# Patient Record
Sex: FEMALE | Race: WHITE | NOT HISPANIC OR LATINO | Employment: UNEMPLOYED | URBAN - METROPOLITAN AREA
[De-identification: names, ages, dates, MRNs, and addresses within clinical notes are randomized per-mention and may not be internally consistent; named-entity substitution may affect disease eponyms.]

---

## 2022-12-08 ENCOUNTER — TELEPHONE (OUTPATIENT)
Dept: FAMILY MEDICINE CLINIC | Facility: CLINIC | Age: 57
End: 2022-12-08

## 2022-12-08 NOTE — TELEPHONE ENCOUNTER
Caller: Ann Slaughter    Relationship: Self     Best call back number: [phone]    What medication are you requesting: ALBUTEROL INHALER     What are your current symptoms: WHEEZING     How long have you been experiencing symptoms: TWO NIGHTS     Have you had these symptoms before:    [x] Yes  [] No    Have you been treated for these symptoms before:   [x] Yes  [] No    If a prescription is needed, what is your preferred pharmacy and phone number:      [facility] [id number] - [place] [id number] AT [facility] [address] ([facility])  [phone] [facility] [phone]       Additional notes: PATIENT EXPERIENCES THIS WHEN SHE LAYS DOWN AND ESPECIALLY WHEN SHE IS ON HER LEFT SIDE. PATIENT STATED SHE HAS BEEN PRESCRIBED AN ALBUTEROL INHALER IN THE PAST AND SHE STILL HAS THE ONE SHE HAS HAD FOR A FEW YEARS AND IT ONLY HAS ONE USE LEFT ON IT. PLEASE ADVISE.

## 2022-12-09 RX ORDER — ALBUTEROL SULFATE 90 UG/1
2 AEROSOL, METERED RESPIRATORY (INHALATION) EVERY 6 HOURS PRN
Qty: 18 G | Refills: 1 | Status: SHIPPED | OUTPATIENT
Start: 2022-12-09

## 2022-12-09 NOTE — TELEPHONE ENCOUNTER
Caller: Ann Slaughter    Relationship: Self    Best call back number: [phone]    What was the call regarding: PATIENT WAS CALLING TO CHECK ON THE STATUS OF THIS REQUEST, SHE WOULD LIKE A CALLBACK AS SOON AS POSSIBLE, BEFORE THE END OF THE DAY.     SHE IS OUT OF HER INHALER AND NEEDS TO PICK ANOTHER ONE UP TODAY.     Do you require a callback: YES

## 2024-01-18 ENCOUNTER — TELEMEDICINE (OUTPATIENT)
Dept: INTERNAL MEDICINE | Facility: CLINIC | Age: 59
End: 2024-01-18
Payer: COMMERCIAL

## 2024-01-18 DIAGNOSIS — H57.89 IRRITATION OF BOTH EYES: Primary | ICD-10-CM

## 2024-01-18 PROCEDURE — 99213 OFFICE O/P EST LOW 20 MIN: CPT

## 2024-01-18 RX ORDER — CIPROFLOXACIN HYDROCHLORIDE 3.5 MG/ML
2 SOLUTION/ DROPS TOPICAL 3 TIMES DAILY
Qty: 2.5 ML | Refills: 0 | Status: SHIPPED | OUTPATIENT
Start: 2024-01-18 | End: 2024-01-23

## 2024-01-18 NOTE — PATIENT INSTRUCTIONS
Instill 2 drops of antibiotic eye solution to both eyes 3 times a day for 5 days.  Recommend saline drops in between antibiotic application as needed for comfort.  Apply warm or cold compress to eyes as needed.  Avoid rubbing eyes with hands.  Follow-up as needed.

## 2024-01-18 NOTE — PROGRESS NOTES
"Chief Complaint  No chief complaint on file.    Subjective        Ann Slaughter presents to [facility]  History of Present Illness  58-year-old female presenting via video visit with watery eyes and bumps of eyelids.  Patient Dr. Bergman.  Has been experiencing watery eyes with skin irritation for over 2 and half weeks.  Symptoms started after an acute episode of eye redness and skin discoloration with swelling.  Swelling subsided and eyes have been watering and eyelids irritated ever since.  Bumps on the eyelids have come and gone.  Try to eliminate triggers such as make-up and detergents.  Wiping eyes with clean clots and tissues.  Has tried allergy medications over-the-counter with little effect.  Has applied Neosporin to eyelids which helps some.  Denies any eye pain or vision changes.  Denies thick drainage or crust around eyes.      Objective   Vital Signs:  There were no vitals taken for this visit.  Estimated body mass index is 22.63 kg/m² as calculated from the following:    Height as of 7/14/23: 165.1 cm (65\").    Weight as of 7/14/23: 61.7 kg (136 lb).       BMI is within normal parameters. No other follow-up for BMI required.      Physical Exam  Constitutional:       Appearance: Normal appearance.   Eyes:     Neurological:      Mental Status: She is alert.        Result Review :      Common labs          3/3/2023    12:00   Common Labs   Glucose 98    BUN 11    Creatinine 1.03    Sodium 141    Potassium 4.6    Chloride 101    Calcium 9.8    Total Protein 7.1    Albumin 4.7    Total Bilirubin 1.0    Alkaline Phosphatase 57    AST (SGOT) 21    ALT (SGPT) 18    WBC 5.2    Hemoglobin 13.5    Hematocrit 40.8    Platelets 268    Total Cholesterol 207    Triglycerides 52    HDL Cholesterol 71    LDL Cholesterol  127    Hemoglobin A1C 5.6          Current Outpatient Medications on File Prior to Visit   Medication Sig Dispense Refill    albuterol sulfate  (90 Base) MCG/ACT " inhaler Inhale 2 puffs Every 6 (Six) Hours As Needed for Wheezing or Shortness of Air. 18 g 1    escitalopram (LEXAPRO) 10 MG tablet Take 1 tablet by mouth Daily. 90 tablet 4    Evening Primrose Oil 1000 MG capsule Take  by mouth.      Lysine 1000 MG tablet Take  by mouth.      SUMAtriptan (IMITREX) 50 MG tablet Take 1 tablet by mouth Every 2 (Two) Hours As Needed for Migraine. MAX OF 2 DOSES IN 24 HOURS. 27 tablet 4    vitamin B-12 (CYANOCOBALAMIN) 1000 MCG tablet Take 1 tablet by mouth Daily.      vitamin C (ASCORBIC ACID) 250 MG tablet Take 1 tablet by mouth Daily.       No current facility-administered medications on file prior to visit.              Assessment and Plan     There are no diagnoses linked to this encounter.  Patient Instructions   Instill 2 drops of antibiotic eye solution to both eyes 3 times a day for 5 days.  Recommend saline drops in between antibiotic application as needed for comfort.  Apply warm or cold compress to eyes as needed.  Avoid rubbing eyes with hands.  Follow-up as needed.    You have chosen to receive care through a telehealth visit.  Do you consent to use a video/audio connection for your medical care today? Yes    During visit, patient was at her residence and provider at medical practice office.       I spent 19 minutes caring for Ann on this date of service. This time includes time spent by me in the following activities:preparing for the visit, reviewing tests, obtaining and/or reviewing a separately obtained history, performing a medically appropriate examination and/or evaluation , ordering medications, tests, or procedures, and documenting information in the medical record  Follow Up     No follow-ups on file.  Patient was given instructions and counseling regarding her condition or for health maintenance advice. Please see specific information pulled into the AVS if appropriate.

## 2024-05-28 ENCOUNTER — PATIENT MESSAGE (OUTPATIENT)
Dept: INTERNAL MEDICINE | Facility: CLINIC | Age: 59
End: 2024-05-28
Payer: COMMERCIAL

## 2024-05-28 DIAGNOSIS — F41.9 ANXIETY AND DEPRESSION: ICD-10-CM

## 2024-05-28 DIAGNOSIS — F32.A ANXIETY AND DEPRESSION: ICD-10-CM

## 2024-05-28 DIAGNOSIS — G43.109 MIGRAINE WITH AURA AND WITHOUT STATUS MIGRAINOSUS, NOT INTRACTABLE: ICD-10-CM

## 2024-05-28 NOTE — TELEPHONE ENCOUNTER
Rx Refill Note  Requested Prescriptions     Pending Prescriptions Disp Refills    SUMAtriptan (IMITREX) 50 MG tablet 27 tablet 0     Sig: Take 1 tablet by mouth Every 2 (Two) Hours As Needed for Migraine. MAX OF 2 DOSES IN 24 HOURS.    escitalopram (LEXAPRO) 10 MG tablet 90 tablet 0     Sig: Take 1 tablet by mouth Daily.      Last office visit with prescribing clinician: 2/28/2023   Last telemedicine visit with prescribing clinician: 1/18/2024   Next office visit with prescribing clinician: 7/26/2024

## 2024-05-29 RX ORDER — SUMATRIPTAN 50 MG/1
50 TABLET, FILM COATED ORAL
Qty: 27 TABLET | Refills: 0 | Status: SHIPPED | OUTPATIENT
Start: 2024-05-29

## 2024-05-29 RX ORDER — ESCITALOPRAM OXALATE 10 MG/1
10 TABLET ORAL DAILY
Qty: 90 TABLET | Refills: 1 | Status: SHIPPED | OUTPATIENT
Start: 2024-05-29

## 2024-06-13 ENCOUNTER — TELEPHONE (OUTPATIENT)
Dept: INTERNAL MEDICINE | Facility: CLINIC | Age: 59
End: 2024-06-13
Payer: COMMERCIAL

## 2024-06-13 DIAGNOSIS — J45.30 MILD PERSISTENT ASTHMA WITHOUT COMPLICATION: Primary | ICD-10-CM

## 2024-06-13 RX ORDER — ALBUTEROL SULFATE 90 UG/1
2 AEROSOL, METERED RESPIRATORY (INHALATION) EVERY 6 HOURS PRN
Qty: 18 G | Refills: 1 | Status: SHIPPED | OUTPATIENT
Start: 2024-06-13

## 2024-06-13 NOTE — TELEPHONE ENCOUNTER
"    Caller: Ann Pettit \"Caron\"    Relationship: Self    Best call back number: [phone]    What medication are you requesting:     albuterol sulfate  (90 Base) MCG/ACT inhaler       Have you had these symptoms before:    [x] Yes  [] No    Have you been treated for these symptoms before:   [x] Yes  [] No    If a prescription is needed, what is your preferred pharmacy and phone number: [facility] #[id number] [place] [address] AT [facility]  & [facility] [phone] [facility] [phone] [facility]     Additional notes:  PLEASE CALL TO CONFIRM OR DISCUSS.       "

## 2024-06-14 ENCOUNTER — OFFICE VISIT (OUTPATIENT)
Dept: INTERNAL MEDICINE | Facility: CLINIC | Age: 59
End: 2024-06-14
Payer: COMMERCIAL

## 2024-06-14 VITALS
WEIGHT: 134 LBS | DIASTOLIC BLOOD PRESSURE: 68 MMHG | HEIGHT: 65 IN | SYSTOLIC BLOOD PRESSURE: 101 MMHG | TEMPERATURE: 98.7 F | HEART RATE: 85 BPM | BODY MASS INDEX: 22.33 KG/M2 | OXYGEN SATURATION: 96 %

## 2024-06-14 DIAGNOSIS — H66.002 NON-RECURRENT ACUTE SUPPURATIVE OTITIS MEDIA OF LEFT EAR WITHOUT SPONTANEOUS RUPTURE OF TYMPANIC MEMBRANE: ICD-10-CM

## 2024-06-14 DIAGNOSIS — R05.1 ACUTE COUGH: ICD-10-CM

## 2024-06-14 DIAGNOSIS — J06.9 UPPER RESPIRATORY TRACT INFECTION, UNSPECIFIED TYPE: Primary | ICD-10-CM

## 2024-06-14 PROCEDURE — 99213 OFFICE O/P EST LOW 20 MIN: CPT

## 2024-06-14 RX ORDER — DEXTROMETHORPHAN HYDROBROMIDE AND PROMETHAZINE HYDROCHLORIDE 15; 6.25 MG/5ML; MG/5ML
5 SYRUP ORAL 4 TIMES DAILY PRN
Qty: 118 ML | Refills: 0 | Status: SHIPPED | OUTPATIENT
Start: 2024-06-14

## 2024-06-14 RX ORDER — DOXYCYCLINE HYCLATE 100 MG/1
100 CAPSULE ORAL 2 TIMES DAILY
Qty: 10 CAPSULE | Refills: 0 | Status: SHIPPED | OUTPATIENT
Start: 2024-06-14 | End: 2024-06-19

## 2024-06-14 NOTE — PROGRESS NOTES
Chief Complaint  Cough, URI, Fever, and Ear Fullness     Subjective:      History of Present Illness {CC  Problem List  Visit  Diagnosis   Encounters  Notes  Medications  Labs  Result Review Imaging  Media :23}     Ann Slaughter presents to [facility] for:      History of Present Illness  Pt states this started two weeks ago. Pt went to [facility] and was offered steroid. Pt states that she passed on this as she has never had a steroid before. Pt states she has gotten worse. Pt states her cough is persistent and productive. PT took her sisters tessalon perles, zicam and flonase. Pt has used her inhaler regularly. Pt has had 100.7 and 100.5 fever over the last two days.   Cough  This is a new problem. The current episode started 1 to 4 weeks ago. The problem has been worse. The problem occurs intermittent. The cough is Productive of sputum. Associated symptoms include chills, ear congestion, a fever, headaches, nasal congestion, a sore throat, shortness of breath and wheezing. Pertinent negatives include no ear pain or myalgias. She has tried ipratropium inhaler and OTC cough suppressant for the symptoms. The treatment provided mild relief.   URI   This is a new problem. The current episode started 1 to 4 weeks ago. The problem has been gradually worsening. The maximum temperature recorded prior to her arrival was 100.4 - 100.9 F. The fever has been present for 1 to 2 days. Associated symptoms include congestion, coughing, headaches, a plugged ear sensation, sinus pain, a sore throat and wheezing. Pertinent negatives include no ear pain.   Ear Fullness   There is pain in the left ear. This is a new problem. The current episode started yesterday. The problem has been comes and goes. The maximum temperature recorded prior to her arrival was 100 - 101 F. The fever has been present for 1 to 2 days. Associated symptoms include coughing, headaches and a sore throat.  Pertinent negatives include no drainage.            I have reviewed patient's medical history, any new submitted information provided by patient or medical assistant and updated medical record.      Objective:      Physical Exam  Vitals reviewed.   Constitutional:       General: She is not in acute distress.     Appearance: Normal appearance. She is ill-appearing. She is not toxic-appearing or diaphoretic.   HENT:      Head: Normocephalic.      Right Ear: Ear canal and external ear normal. There is no impacted cerumen. Tympanic membrane is bulging.      Left Ear: Ear canal and external ear normal. There is no impacted cerumen. Tympanic membrane is erythematous and bulging.      Nose: Congestion present.      Right Sinus: Maxillary sinus tenderness and frontal sinus tenderness present.      Left Sinus: Maxillary sinus tenderness and frontal sinus tenderness present.      Mouth/Throat:      Mouth: Mucous membranes are moist.      Pharynx: Posterior oropharyngeal erythema present. No oropharyngeal exudate.   Eyes:      General:         Right eye: No discharge.         Left eye: No discharge.      Conjunctiva/sclera: Conjunctivae normal.      Pupils: Pupils are equal, round, and reactive to light.   Cardiovascular:      Rate and Rhythm: Normal rate and regular rhythm.      Pulses: Normal pulses.      Heart sounds: Normal heart sounds.   Pulmonary:      Effort: Pulmonary effort is normal.      Breath sounds: Examination of the right-lower field reveals decreased breath sounds. Examination of the left-lower field reveals decreased breath sounds. Decreased breath sounds present.   Lymphadenopathy:      Cervical: Cervical adenopathy present.   Skin:     General: Skin is warm and dry.      Capillary Refill: Capillary refill takes less than 2 seconds.   Neurological:      General: No focal deficit present.      Mental Status: She is alert.   Psychiatric:         Mood and Affect: Mood normal.         Behavior: Behavior normal.  "       Result Review  Data Reviewed:{ Labs  Result Review  Imaging  Med Tab  Media :23}                Vital Signs:   /68 (BP Location: Left arm, Patient Position: Sitting, Cuff Size: Adult)   Pulse 85   Temp 98.7 °F (37.1 °C) (Oral)   Ht 165.1 cm (65\")   Wt 60.8 kg (134 lb)   SpO2 96%   BMI 22.30 kg/m²   Estimated body mass index is 22.3 kg/m² as calculated from the following:    Height as of this encounter: 165.1 cm (65\").    Weight as of this encounter: 60.8 kg (134 lb).        Requested Prescriptions     Signed Prescriptions Disp Refills    doxycycline (VIBRAMYCIN) 100 MG capsule 10 capsule 0     Sig: Take 1 capsule by mouth 2 (Two) Times a Day for 5 days.    promethazine-dextromethorphan (PROMETHAZINE-DM) 6.25-15 MG/5ML syrup 118 mL 0     Sig: Take 5 mL by mouth 4 (Four) Times a Day As Needed for Cough.       Routine medications provided by this office will also be refilled via pharmacy request.       Current Outpatient Medications:     albuterol sulfate  (90 Base) MCG/ACT inhaler, Inhale 2 puffs Every 6 (Six) Hours As Needed for Wheezing or Shortness of Air., Disp: 18 g, Rfl: 1    escitalopram (LEXAPRO) 10 MG tablet, Take 1 tablet by mouth Daily., Disp: 90 tablet, Rfl: 1    Evening Primrose Oil 1000 MG capsule, Take  by mouth., Disp: , Rfl:     Lysine 1000 MG tablet, Take  by mouth., Disp: , Rfl:     SUMAtriptan (IMITREX) 50 MG tablet, Take 1 tablet by mouth Every 2 (Two) Hours As Needed for Migraine. MAX OF 2 DOSES IN 24 HOURS., Disp: 27 tablet, Rfl: 0    vitamin B-12 (CYANOCOBALAMIN) 1000 MCG tablet, Take 1 tablet by mouth Daily., Disp: , Rfl:     vitamin C (ASCORBIC ACID) 250 MG tablet, Take 1 tablet by mouth Daily., Disp: , Rfl:     doxycycline (VIBRAMYCIN) 100 MG capsule, Take 1 capsule by mouth 2 (Two) Times a Day for 5 days., Disp: 10 capsule, Rfl: 0    promethazine-dextromethorphan (PROMETHAZINE-DM) 6.25-15 MG/5ML syrup, Take 5 mL by mouth 4 (Four) Times a Day As Needed for " Cough., Disp: 118 mL, Rfl: 0     Assessment and Plan:      Assessment and Plan {CC Problem List  Visit Diagnosis  ROS  Review (Popup)  Health Maintenance  Quality  BestPractice  Medications  SmartSets  SnapShot Encounters  Media :23}     Diagnoses and all orders for this visit:    1. Upper respiratory tract infection, unspecified type (Primary)  -     doxycycline (VIBRAMYCIN) 100 MG capsule; Take 1 capsule by mouth 2 (Two) Times a Day for 5 days.  Dispense: 10 capsule; Refill: 0    2. Acute cough  -     promethazine-dextromethorphan (PROMETHAZINE-DM) 6.25-15 MG/5ML syrup; Take 5 mL by mouth 4 (Four) Times a Day As Needed for Cough.  Dispense: 118 mL; Refill: 0    3. Non-recurrent acute suppurative otitis media of left ear without spontaneous rupture of tympanic membrane  -     doxycycline (VIBRAMYCIN) 100 MG capsule; Take 1 capsule by mouth 2 (Two) Times a Day for 5 days.  Dispense: 10 capsule; Refill: 0             New Medications Ordered This Visit   Medications    doxycycline (VIBRAMYCIN) 100 MG capsule     Sig: Take 1 capsule by mouth 2 (Two) Times a Day for 5 days.     Dispense:  10 capsule     Refill:  0    promethazine-dextromethorphan (PROMETHAZINE-DM) 6.25-15 MG/5ML syrup     Sig: Take 5 mL by mouth 4 (Four) Times a Day As Needed for Cough.     Dispense:  118 mL     Refill:  0       Educated patient on dosing and side effects of doxycycline and Promethazine DM.  Educated patient that if this worsens or fever is unable to be treated to be seen in the emergency room.  If this antibiotic does not help we will consider adding additional antibiotic or steroid.  Patient verbalized understanding and is comfortable with plan of care.    Follow Up {Instructions Charge Capture  Follow-up Communications :23}     No follow-ups on file.      Patient was given instructions and counseling regarding her condition or for health maintenance advice. Please see specific information pulled into the AVS if  appropriate.    Dragon disclaimer:   Much of this encounter note is an electronic transcription/translation of spoken language to printed text. The electronic translation of spoken language may permit erroneous, or at times, nonsensical words or phrases to be inadvertently transcribed; Although I have reviewed the note for such errors, some may still exist.     Additional Patient Counseling:       There are no Patient Instructions on file for this visit.

## 2024-07-24 ENCOUNTER — OFFICE VISIT (OUTPATIENT)
Dept: INTERNAL MEDICINE | Facility: CLINIC | Age: 59
End: 2024-07-24
Payer: COMMERCIAL

## 2024-07-24 VITALS
SYSTOLIC BLOOD PRESSURE: 115 MMHG | HEIGHT: 65 IN | DIASTOLIC BLOOD PRESSURE: 60 MMHG | HEART RATE: 78 BPM | WEIGHT: 128 LBS | RESPIRATION RATE: 18 BRPM | BODY MASS INDEX: 21.33 KG/M2 | OXYGEN SATURATION: 96 %

## 2024-07-24 DIAGNOSIS — Z13.1 SCREENING FOR DIABETES MELLITUS: ICD-10-CM

## 2024-07-24 DIAGNOSIS — Z00.00 ENCOUNTER FOR HEALTH MAINTENANCE EXAMINATION: Primary | ICD-10-CM

## 2024-07-24 DIAGNOSIS — Z13.0 SCREENING FOR DEFICIENCY ANEMIA: ICD-10-CM

## 2024-07-24 DIAGNOSIS — Z13.220 SCREENING FOR LIPID DISORDERS: ICD-10-CM

## 2024-07-24 DIAGNOSIS — Z13.29 SCREENING FOR THYROID DISORDER: ICD-10-CM

## 2024-07-24 LAB
ALBUMIN SERPL-MCNC: 4.8 G/DL (ref 3.5–5.2)
ALBUMIN/GLOB SERPL: 1.9 G/DL
ALP SERPL-CCNC: 63 U/L (ref 39–117)
ALT SERPL-CCNC: 14 U/L (ref 1–33)
AST SERPL-CCNC: 23 U/L (ref 1–32)
BILIRUB SERPL-MCNC: 1 MG/DL (ref 0–1.2)
BUN SERPL-MCNC: 8 MG/DL (ref 8–23)
BUN/CREAT SERPL: 7.7 (ref 7–25)
CALCIUM SERPL-MCNC: 9.6 MG/DL (ref 8.6–10.5)
CHLORIDE SERPL-SCNC: 100 MMOL/L (ref 98–107)
CHOLEST SERPL-MCNC: 227 MG/DL (ref 0–200)
CO2 SERPL-SCNC: 28.9 MMOL/L (ref 22–29)
CREAT SERPL-MCNC: 1.04 MG/DL (ref 0.57–1)
EGFRCR SERPLBLD CKD-EPI 2021: 61.7 ML/MIN/1.73
ERYTHROCYTE [DISTWIDTH] IN BLOOD BY AUTOMATED COUNT: 13 % (ref 12.3–15.4)
GLOBULIN SER CALC-MCNC: 2.5 GM/DL
GLUCOSE SERPL-MCNC: 95 MG/DL (ref 65–99)
HBA1C MFR BLD: 5.5 % (ref 4.8–5.6)
HCT VFR BLD AUTO: 41 % (ref 34–46.6)
HDLC SERPL-MCNC: 80 MG/DL (ref 40–60)
HGB BLD-MCNC: 13.5 G/DL (ref 12–15.9)
LDLC SERPL CALC-MCNC: 139 MG/DL (ref 0–100)
LDLC/HDLC SERPL: 1.73 {RATIO}
MCH RBC QN AUTO: 29.9 PG (ref 26.6–33)
MCHC RBC AUTO-ENTMCNC: 32.9 G/DL (ref 31.5–35.7)
MCV RBC AUTO: 90.7 FL (ref 79–97)
PLATELET # BLD AUTO: 298 10*3/MM3 (ref 140–450)
POTASSIUM SERPL-SCNC: 4.7 MMOL/L (ref 3.5–5.2)
PROT SERPL-MCNC: 7.3 G/DL (ref 6–8.5)
RBC # BLD AUTO: 4.52 10*6/MM3 (ref 3.77–5.28)
SODIUM SERPL-SCNC: 138 MMOL/L (ref 136–145)
TRIGL SERPL-MCNC: 45 MG/DL (ref 0–150)
TSH SERPL DL<=0.005 MIU/L-ACNC: 1.87 UIU/ML (ref 0.27–4.2)
VLDLC SERPL CALC-MCNC: 8 MG/DL (ref 5–40)
WBC # BLD AUTO: 4.22 10*3/MM3 (ref 3.4–10.8)

## 2024-07-24 PROCEDURE — 99396 PREV VISIT EST AGE 40-64: CPT | Performed by: FAMILY MEDICINE

## 2024-07-24 NOTE — PATIENT INSTRUCTIONS
"MyChart Tips:    [employer] can be a useful part of our patient care program and is a way for us to communicate lab results and for you to request refills.  Here is some information regarding the best way to use Ev[employer] for communication.       Examples of medical issues that are APPROPRIATE for [employer]:  -Follow-up on problems we have already addressed in a visit such as home testing results, blood pressure readings, glucose readings  -Questions that can be answered with a simple \"yes\" or \"no\"  -Please keep communication concise and to the point.  All other types of communication should be held for an office visit.    Communication that is NOT APPROPRIATE for [employer]:  -New problems, serious problems or urgent problems.  Urgent matters should be addressed by phone for advice, in the office, urgent care or the ER.  -Requesting Paxlovid or Antibiotics: These types of problems require an evaluation unless your provider approved this previously.    -Evi messages are not email.  Staff will check messages each weekday.  We strive for a 48-hour turnaround on messaging.    -Evi is not for private issues.  Messages are received first by our office staff.    Please be mindful that sometimes it may take longer to receive a response on Evi.  Many times of the year we have high volumes of messages coming into physician inboxes.      Please also be mindful that Evi messages become part of your permanent medical record.    We appreciate your respect for the limitations and boundaries of Evi.      Lab Results:  Please allow up to 7 business days for lab results to be sent through Evi to you before contacting your provider.  Sometimes we are waiting for results to get back from the lab and also your provider needs time to analyze them thoroughly before making recommendations.  Also, providers may be out of the office on vacation.      While the internet has great resources, it is not a substitute for " interpreting lab results.

## 2024-07-24 NOTE — PROGRESS NOTES
"Chief Complaint   Patient presents with    Annual Exam       Subjective       CPE- Patient is here today for annual physical.  Overall doing well.  No concerns at this time.  Taking all medication as prescribed below.      Current Outpatient Medications:     albuterol sulfate  (90 Base) MCG/ACT inhaler, Inhale 2 puffs Every 6 (Six) Hours As Needed for Wheezing or Shortness of Air., Disp: 18 g, Rfl: 1    escitalopram (LEXAPRO) 10 MG tablet, Take 1 tablet by mouth Daily., Disp: 90 tablet, Rfl: 1    Evening Primrose Oil 1000 MG capsule, Take  by mouth., Disp: , Rfl:     Lysine 1000 MG tablet, Take  by mouth., Disp: , Rfl:     SUMAtriptan (IMITREX) 50 MG tablet, Take 1 tablet by mouth Every 2 (Two) Hours As Needed for Migraine. MAX OF 2 DOSES IN 24 HOURS., Disp: 27 tablet, Rfl: 0    vitamin B-12 (CYANOCOBALAMIN) 1000 MCG tablet, Take 1 tablet by mouth Daily., Disp: , Rfl:     vitamin C (ASCORBIC ACID) 250 MG tablet, Take 1 tablet by mouth Daily., Disp: , Rfl:       Labs: Due for routine labs    Colorectal cancer screening: [facility]  Breast cancer screening: [facility]  Cervical cancer screening: [facility]      Vitals:    07/24/24 1042   BP: 115/60   BP Location: Right arm   Patient Position: Sitting   Cuff Size: Small Adult   Pulse: 78   Resp: 18   SpO2: 96%   Weight: 58.1 kg (128 lb)   Height: 165.1 cm (65\")     Body mass index is 21.3 kg/m².  BMI is within normal parameters. No other follow-up for BMI required.        Physical Exam  Vitals and nursing note reviewed.   Constitutional:       General: She is not in acute distress.     Appearance: Normal appearance.   HENT:      Right Ear: Tympanic membrane, ear canal and external ear normal.      Left Ear: Tympanic membrane, ear canal and external ear normal.      Nose: Nose normal.      Mouth/Throat:      Mouth: Mucous membranes are moist.   Eyes:      General:         Right eye: No discharge.         Left eye: No discharge.      Conjunctiva/sclera: Conjunctivae normal. "   Cardiovascular:      Rate and Rhythm: Normal rate and regular rhythm.      Pulses: Normal pulses.      Heart sounds: Normal heart sounds.   Pulmonary:      Effort: Pulmonary effort is normal.      Breath sounds: Normal breath sounds.   Abdominal:      General: Bowel sounds are normal. There is no distension.      Palpations: Abdomen is soft.      Tenderness: There is no abdominal tenderness.   Musculoskeletal:      Cervical back: Neck supple.      Right lower leg: No edema.      Left lower leg: No edema.   Lymphadenopathy:      Cervical: No cervical adenopathy.   Skin:     General: Skin is warm.      Findings: No rash.   Neurological:      General: No focal deficit present.      Mental Status: She is alert. Mental status is at baseline.   Psychiatric:         Mood and Affect: Mood normal.         Behavior: Behavior normal.                   Diagnoses and all orders for this visit:    1. Encounter for health maintenance examination (Primary)  -     CBC (No Diff)  -     Comprehensive Metabolic Panel  -     Lipid Panel With LDL / HDL Ratio  -     Hemoglobin A1c  -     TSH Rfx On Abnormal To Free T4    2. Screening for deficiency anemia  -     CBC (No Diff)    3. Screening for diabetes mellitus  -     Comprehensive Metabolic Panel  -     Hemoglobin A1c    4. Screening for thyroid disorder  -     TSH Rfx On Abnormal To Free T4    5. Screening for lipid disorders  -     Lipid Panel With LDL / HDL Ratio           The patient was counseled regarding nutrition, physical activity, healthy weight, injury prevention, misuse of tobacco, alcohol and illicit drugs, mental health, immunizations, and screenings.    Return if symptoms worsen or fail to improve.

## 2024-07-26 DIAGNOSIS — G43.109 MIGRAINE WITH AURA AND WITHOUT STATUS MIGRAINOSUS, NOT INTRACTABLE: ICD-10-CM

## 2024-07-26 DIAGNOSIS — F41.9 ANXIETY AND DEPRESSION: ICD-10-CM

## 2024-07-26 DIAGNOSIS — F32.A ANXIETY AND DEPRESSION: ICD-10-CM

## 2024-07-26 RX ORDER — ESCITALOPRAM OXALATE 10 MG/1
10 TABLET ORAL DAILY
Qty: 90 TABLET | Refills: 0 | Status: SHIPPED | OUTPATIENT
Start: 2024-07-26

## 2024-07-26 RX ORDER — SUMATRIPTAN 50 MG/1
50 TABLET, FILM COATED ORAL
Qty: 27 TABLET | Refills: 0 | Status: SHIPPED | OUTPATIENT
Start: 2024-07-26

## 2024-07-26 NOTE — TELEPHONE ENCOUNTER
I am not sending in a year for anyone.  If I did, it was an error on her .  I am only sending in 90 days.  The practice will refill any medications after I am gone to urgent care.

## 2024-10-24 ENCOUNTER — PATIENT MESSAGE (OUTPATIENT)
Dept: INTERNAL MEDICINE | Facility: CLINIC | Age: 59
End: 2024-10-24
Payer: COMMERCIAL

## 2024-10-24 DIAGNOSIS — G43.109 MIGRAINE WITH AURA AND WITHOUT STATUS MIGRAINOSUS, NOT INTRACTABLE: ICD-10-CM

## 2024-10-24 DIAGNOSIS — F41.9 ANXIETY AND DEPRESSION: ICD-10-CM

## 2024-10-24 DIAGNOSIS — F32.A ANXIETY AND DEPRESSION: ICD-10-CM

## 2024-11-01 RX ORDER — ESCITALOPRAM OXALATE 10 MG/1
10 TABLET ORAL DAILY
Qty: 90 TABLET | Refills: 0 | Status: SHIPPED | OUTPATIENT
Start: 2024-11-01

## 2024-11-01 RX ORDER — SUMATRIPTAN 50 MG/1
50 TABLET, FILM COATED ORAL
Qty: 27 TABLET | Refills: 0 | Status: SHIPPED | OUTPATIENT
Start: 2024-11-01

## 2024-11-01 NOTE — TELEPHONE ENCOUNTER
Rx Refill Note  Requested Prescriptions     Pending Prescriptions Disp Refills    escitalopram (LEXAPRO) 10 MG tablet 90 tablet 0     Sig: Take 1 tablet by mouth Daily.    SUMAtriptan (IMITREX) 50 MG tablet 27 tablet 0     Sig: Take 1 tablet by mouth Every 2 (Two) Hours As Needed for Migraine. MAX OF 2 DOSES IN 24 HOURS.      Last office visit with prescribing clinician: Visit date not found   Last telemedicine visit with prescribing clinician: Visit date not found   Next office visit with prescribing clinician: Visit date not found                         Would you like a call back once the refill request has been completed: [] Yes [] No    If the office needs to give you a call back, can they leave a voicemail: [] Yes [] No    Snow Lindsay  11/01/24, 09:09 EDT

## 2025-01-13 ENCOUNTER — TELEPHONE (OUTPATIENT)
Dept: INTERNAL MEDICINE | Facility: CLINIC | Age: 60
End: 2025-01-13
Payer: COMMERCIAL

## 2025-01-13 DIAGNOSIS — G43.109 MIGRAINE WITH AURA AND WITHOUT STATUS MIGRAINOSUS, NOT INTRACTABLE: ICD-10-CM

## 2025-01-13 NOTE — TELEPHONE ENCOUNTER
"Caller: Ann Pettit \"Caron\"    Relationship to patient: Self    Patient is needing: PATIENT WANTS TO MAKE SURE THAT HER REQUEST FOR HER MIGRAINE MEDICATION GETS REFILLED, AND SHE STATES SHE HAS AN APPT NEXT WEEK WITH GENARO HER.           "

## 2025-01-14 RX ORDER — SUMATRIPTAN 50 MG/1
50 TABLET, FILM COATED ORAL
Qty: 27 TABLET | Refills: 0 | Status: SHIPPED | OUTPATIENT
Start: 2025-01-14

## 2025-01-27 ENCOUNTER — OFFICE VISIT (OUTPATIENT)
Dept: INTERNAL MEDICINE | Facility: CLINIC | Age: 60
End: 2025-01-27
Payer: COMMERCIAL

## 2025-01-27 VITALS
TEMPERATURE: 97.8 F | BODY MASS INDEX: 23.93 KG/M2 | DIASTOLIC BLOOD PRESSURE: 68 MMHG | HEART RATE: 69 BPM | SYSTOLIC BLOOD PRESSURE: 106 MMHG | WEIGHT: 140.2 LBS | OXYGEN SATURATION: 95 % | HEIGHT: 64 IN

## 2025-01-27 DIAGNOSIS — H93.12 TINNITUS OF LEFT EAR: Chronic | ICD-10-CM

## 2025-01-27 DIAGNOSIS — Z76.89 ENCOUNTER TO ESTABLISH CARE: Primary | ICD-10-CM

## 2025-01-27 DIAGNOSIS — F32.A ANXIETY AND DEPRESSION: Chronic | ICD-10-CM

## 2025-01-27 DIAGNOSIS — G43.109 MIGRAINE WITH AURA AND WITHOUT STATUS MIGRAINOSUS, NOT INTRACTABLE: Chronic | ICD-10-CM

## 2025-01-27 DIAGNOSIS — F41.9 ANXIETY AND DEPRESSION: Chronic | ICD-10-CM

## 2025-01-27 PROCEDURE — 99214 OFFICE O/P EST MOD 30 MIN: CPT

## 2025-01-27 RX ORDER — ESCITALOPRAM OXALATE 10 MG/1
10 TABLET ORAL DAILY
Qty: 90 TABLET | Refills: 1 | Status: SHIPPED | OUTPATIENT
Start: 2025-01-27

## 2025-01-27 RX ORDER — SUMATRIPTAN 50 MG/1
50 TABLET, FILM COATED ORAL
Qty: 27 TABLET | Refills: 0 | Status: SHIPPED | OUTPATIENT
Start: 2025-01-27

## 2025-01-27 NOTE — PROGRESS NOTES
Chief Complaint  Establish Care and Med Refill     Subjective:      History of Present Illness {CC  Problem List  Visit  Diagnosis   Encounters  Notes  Medications  Labs  Result Review Imaging  Media :23}     Ann Slaughter presents to [facility] for:      History of Present Illness        The patient presents to establish care with a new provider.    She has been on Lexapro 10 mg for an extended period, which she reports as beneficial. She is seeking a refill of this medication.    She experiences migraines approximately 4 to 5 times per month. She manages these with sumatriptan, magnesium, or Advil, depending on the severity of the headache. She is also requesting a refill of her sumatriptan prescription. She is interested in exploring other treatment options for her migraines.    She has been experiencing tinnitus for an extended period, with varying degrees of severity. She does not recall any childhood ear infections but acknowledges a recent ear infection. However, she does not believe the tinnitus worsened during this time. She does not have an ENT specialist and is seeking a referral.    She has been dealing with sinus issues over the past week, which have caused significant discomfort. She has used Flonase to alleviate her symptoms, which resulted in the expectoration of dark green mucus. She is concerned about potential side effects of daily Flonase use.    MEDICATIONS  Lexapro, sumatriptan, magnesium, Advil         I have reviewed patient's medical history, any new submitted information provided by patient or medical assistant and updated medical record.      Objective:      Physical Exam  Vitals reviewed.   Constitutional:       Appearance: Normal appearance.   HENT:      Head: Normocephalic.      Right Ear: Ear canal and external ear normal. A middle ear effusion is present. There is no impacted cerumen.      Left Ear: Ear canal and external ear  "normal. A middle ear effusion is present. There is no impacted cerumen.   Cardiovascular:      Rate and Rhythm: Normal rate and regular rhythm.      Pulses: Normal pulses.      Heart sounds: Normal heart sounds.   Pulmonary:      Effort: Pulmonary effort is normal.      Breath sounds: Normal breath sounds.   Abdominal:      General: Abdomen is flat. Bowel sounds are normal.      Palpations: Abdomen is soft.   Skin:     General: Skin is warm and dry.      Capillary Refill: Capillary refill takes less than 2 seconds.   Neurological:      General: No focal deficit present.      Mental Status: She is alert.   Psychiatric:         Mood and Affect: Mood normal.         Behavior: Behavior normal.        Result Review  Data Reviewed:{ Labs  Result Review  Imaging  Med Tab  Media :23}     Results  Laboratory Studies  Cholesterol was a little bit high, but HDL was also high.                  Vital Signs:   /68 (BP Location: Left arm, Patient Position: Sitting, Cuff Size: Adult)   Pulse 69   Temp 97.8 °F (36.6 °C) (Oral)   Ht 162.6 cm (64\")   Wt 63.6 kg (140 lb 3.2 oz)   SpO2 95%   BMI 24.07 kg/m²                 Requested Prescriptions     Pending Prescriptions Disp Refills    SUMAtriptan (IMITREX) 50 MG tablet 27 tablet 0     Sig: Take 1 tablet by mouth Every 2 (Two) Hours As Needed for Migraine. MAX OF 2 DOSES IN 24 HOURS.    escitalopram (LEXAPRO) 10 MG tablet 90 tablet 0     Sig: Take 1 tablet by mouth Daily.       Routine medications provided by this office will also be refilled via pharmacy request.       Current Outpatient Medications:     albuterol sulfate  (90 Base) MCG/ACT inhaler, Inhale 2 puffs Every 6 (Six) Hours As Needed for Wheezing or Shortness of Air., Disp: 18 g, Rfl: 1    escitalopram (LEXAPRO) 10 MG tablet, Take 1 tablet by mouth Daily., Disp: 90 tablet, Rfl: 0    Lysine 1000 MG tablet, Take  by mouth., Disp: , Rfl:     SUMAtriptan (IMITREX) 50 MG tablet, Take 1 tablet by mouth " Every 2 (Two) Hours As Needed for Migraine. MAX OF 2 DOSES IN 24 HOURS., Disp: 27 tablet, Rfl: 0    vitamin B-12 (CYANOCOBALAMIN) 1000 MCG tablet, Take 1 tablet by mouth Daily., Disp: , Rfl:     vitamin C (ASCORBIC ACID) 250 MG tablet, Take 1 tablet by mouth Daily., Disp: , Rfl:     Evening Primrose Oil 1000 MG capsule, Take  by mouth., Disp: , Rfl:      Assessment and Plan:      Assessment and Plan {CC Problem List  Visit Diagnosis  ROS  Review (Popup)  [facility] Maintenance  Quality  BestPractice  Medications  SmartSets  SnapShot Encounters  Media :23}     Problem List Items Addressed This Visit       Anxiety and depression (Chronic)    Migraines (Chronic)          1. Migraine.  She uses sumatriptan approximately 4 to 5 times a month for severe headaches. If the frequency of sumatriptan use increases, alternative medications such as propranolol or Qulipta may be considered, although propranolol requires monitoring due to her low blood pressure, and Qulipta may need prior authorization. Other abortive medications like Nurtec and Ubrelvy were discussed as potential options if Imitrex becomes less effective. A prescription for a 3-month supply of sumatriptan will be sent to [facility].    2. Tinnitus.  She reports long-standing tinnitus, which sometimes worsens but is often unnoticeable. Examination revealed fluid behind the tympanic membrane, possibly due to allergies. Daily use of over-the-counter antihistamines such as Zyrtec, Xyzal, or Claritin is recommended. Nasal antihistamines like Astepro or Flonase can also be used daily. A referral to an ENT specialist for a comprehensive ear examination and hearing test is provided.    3. Cholesterol management.  Her last lab work in July 2024 indicated slightly elevated cholesterol levels, but her HDL was also high, which is beneficial. No medication is prescribed at this time. She is advised to continue monitoring her cholesterol levels and consider  dietary adjustments.    4. Medication management.  She is currently on Lexapro 10 mg and reports feeling good on this medication. A prescription for a 3-month supply of Lexapro will be sent to [facility].    Follow-up  The patient will follow up in 6 months for her annual physical.     Patient verbalizes understanding and is comfortable with the plan of care.      Follow Up {Instructions Charge Capture  Follow-up Communications :23}     No follow-ups on file.      Patient was given instructions and counseling regarding her condition or for health maintenance advice. Please see specific information pulled into the AVS if appropriate.    Dragon disclaimer:   Much of this encounter note is an electronic transcription/translation of spoken language to printed text. The electronic translation of spoken language may permit erroneous, or at times, nonsensical words or phrases to be inadvertently transcribed; Although I have reviewed the note for such errors, some may still exist.         Additional Patient Counseling:       There are no Patient Instructions on file for this visit.    Patient or patient representative verbalized consent for the use of Ambient Listening during the visit with  BERLIN Burger for chart documentation. 1/27/2025  16:18 EST

## 2025-03-15 DIAGNOSIS — G43.109 MIGRAINE WITH AURA AND WITHOUT STATUS MIGRAINOSUS, NOT INTRACTABLE: Chronic | ICD-10-CM

## 2025-03-17 RX ORDER — SUMATRIPTAN 50 MG/1
TABLET, FILM COATED ORAL
Qty: 27 TABLET | Refills: 8 | OUTPATIENT
Start: 2025-03-17

## 2025-04-19 DIAGNOSIS — G43.109 MIGRAINE WITH AURA AND WITHOUT STATUS MIGRAINOSUS, NOT INTRACTABLE: Chronic | ICD-10-CM

## 2025-04-24 NOTE — TELEPHONE ENCOUNTER
Rx Refill Note  Requested Prescriptions     Pending Prescriptions Disp Refills    Lysine 1000 MG tablet       Sig: Take  by mouth.    SUMAtriptan (IMITREX) 50 MG tablet 27 tablet 0     Sig: Take 1 tablet by mouth Every 2 (Two) Hours As Needed for Migraine. MAX OF 2 DOSES IN 24 HOURS.      Last office visit with prescribing clinician: 1/27/2025   Last telemedicine visit with prescribing clinician: Visit date not found   Next office visit with prescribing clinician: 8/11/2025                         Would you like a call back once the refill request has been completed: [] Yes [] No    If the office needs to give you a call back, can they leave a voicemail: [] Yes [] No    Marquez Yang MA  04/24/25, 08:46 EDT

## 2025-04-25 NOTE — TELEPHONE ENCOUNTER
"  Caller: Ann Pettit \"Caron\"    Relationship: Self    Best call back number: [phone]      What was the call regarding: PATIENT IS CALLING TO FOLLOW UP ON THE MEDICATION REFILL          [facility].          SUMAtriptan (IMITREX) 50 MG tablet       Lysine 1000 MG tablet     PLEASE GIVE CALLBACK       "

## 2025-04-27 RX ORDER — SUMATRIPTAN 50 MG/1
50 TABLET, FILM COATED ORAL
Qty: 27 TABLET | Refills: 0 | Status: SHIPPED | OUTPATIENT
Start: 2025-04-27

## 2025-07-03 DIAGNOSIS — F41.9 ANXIETY AND DEPRESSION: Chronic | ICD-10-CM

## 2025-07-03 DIAGNOSIS — F32.A ANXIETY AND DEPRESSION: Chronic | ICD-10-CM

## 2025-07-03 RX ORDER — ESCITALOPRAM OXALATE 10 MG/1
10 TABLET ORAL DAILY
Qty: 90 TABLET | Refills: 0 | Status: SHIPPED | OUTPATIENT
Start: 2025-07-03

## 2025-08-11 ENCOUNTER — OFFICE VISIT (OUTPATIENT)
Dept: INTERNAL MEDICINE | Facility: CLINIC | Age: 60
End: 2025-08-11
Payer: COMMERCIAL

## 2025-08-11 VITALS
SYSTOLIC BLOOD PRESSURE: 104 MMHG | OXYGEN SATURATION: 98 % | DIASTOLIC BLOOD PRESSURE: 68 MMHG | TEMPERATURE: 97.7 F | HEART RATE: 60 BPM | BODY MASS INDEX: 23.6 KG/M2 | WEIGHT: 138.2 LBS | HEIGHT: 64 IN

## 2025-08-11 DIAGNOSIS — Z13.1 SCREENING FOR DIABETES MELLITUS: ICD-10-CM

## 2025-08-11 DIAGNOSIS — J45.40 MODERATE PERSISTENT ASTHMA WITHOUT COMPLICATION: Chronic | ICD-10-CM

## 2025-08-11 DIAGNOSIS — Z13.6 HYPERTENSION SCREEN: ICD-10-CM

## 2025-08-11 DIAGNOSIS — F32.A ANXIETY AND DEPRESSION: Chronic | ICD-10-CM

## 2025-08-11 DIAGNOSIS — G43.E11 INTRACTABLE CHRONIC MIGRAINE WITH AURA WITH STATUS MIGRAINOSUS: Chronic | ICD-10-CM

## 2025-08-11 DIAGNOSIS — G43.109 MIGRAINE WITH AURA AND WITHOUT STATUS MIGRAINOSUS, NOT INTRACTABLE: Chronic | ICD-10-CM

## 2025-08-11 DIAGNOSIS — Z80.0 FAMILY HX OF COLON CANCER: Chronic | ICD-10-CM

## 2025-08-11 DIAGNOSIS — Z12.11 ENCOUNTER FOR SCREENING FOR MALIGNANT NEOPLASM OF COLON: ICD-10-CM

## 2025-08-11 DIAGNOSIS — Z12.11 SCREENING FOR COLON CANCER: ICD-10-CM

## 2025-08-11 DIAGNOSIS — E78.2 MODERATE MIXED HYPERLIPIDEMIA NOT REQUIRING STATIN THERAPY: Chronic | ICD-10-CM

## 2025-08-11 DIAGNOSIS — Z28.20 VACCINE REFUSED BY PATIENT: ICD-10-CM

## 2025-08-11 DIAGNOSIS — Z00.00 ROUTINE GENERAL MEDICAL EXAMINATION AT A HEALTH CARE FACILITY: Primary | ICD-10-CM

## 2025-08-11 DIAGNOSIS — F41.9 ANXIETY AND DEPRESSION: Chronic | ICD-10-CM

## 2025-08-11 DIAGNOSIS — Z13.6 SCREENING FOR CARDIOVASCULAR CONDITION: ICD-10-CM

## 2025-08-11 DIAGNOSIS — Z83.49 FAMILY HISTORY OF HYPOTHYROIDISM: Chronic | ICD-10-CM

## 2025-08-11 DIAGNOSIS — Z91.89 AT RISK FOR CORONARY ARTERY DISEASE: ICD-10-CM

## 2025-08-11 PROCEDURE — 99396 PREV VISIT EST AGE 40-64: CPT

## 2025-08-11 PROCEDURE — 99214 OFFICE O/P EST MOD 30 MIN: CPT

## 2025-08-12 ENCOUNTER — RESULTS FOLLOW-UP (OUTPATIENT)
Dept: INTERNAL MEDICINE | Facility: CLINIC | Age: 60
End: 2025-08-12
Payer: COMMERCIAL

## 2025-08-12 LAB
ALBUMIN SERPL-MCNC: 4.6 G/DL (ref 3.9–4.9)
ALP SERPL-CCNC: 57 IU/L (ref 44–121)
ALT SERPL-CCNC: 15 IU/L (ref 0–32)
AST SERPL-CCNC: 22 IU/L (ref 0–40)
BASOPHILS # BLD AUTO: 0 X10E3/UL (ref 0–0.2)
BASOPHILS NFR BLD AUTO: 1 %
BILIRUB SERPL-MCNC: 0.9 MG/DL (ref 0–1.2)
BUN SERPL-MCNC: 12 MG/DL (ref 8–27)
BUN/CREAT SERPL: 13 (ref 12–28)
CALCIUM SERPL-MCNC: 9.6 MG/DL (ref 8.7–10.3)
CHLORIDE SERPL-SCNC: 101 MMOL/L (ref 96–106)
CHOLEST SERPL-MCNC: 212 MG/DL (ref 100–199)
CO2 SERPL-SCNC: 25 MMOL/L (ref 20–29)
CREAT SERPL-MCNC: 0.9 MG/DL (ref 0.57–1)
EGFRCR SERPLBLD CKD-EPI 2021: 73 ML/MIN/1.73
EOSINOPHIL # BLD AUTO: 0.1 X10E3/UL (ref 0–0.4)
EOSINOPHIL NFR BLD AUTO: 3 %
ERYTHROCYTE [DISTWIDTH] IN BLOOD BY AUTOMATED COUNT: 13 % (ref 11.7–15.4)
GLOBULIN SER CALC-MCNC: 2.4 G/DL (ref 1.5–4.5)
GLUCOSE SERPL-MCNC: 86 MG/DL (ref 70–99)
HBA1C MFR BLD: 5.7 % (ref 4.8–5.6)
HCT VFR BLD AUTO: 44.8 % (ref 34–46.6)
HDLC SERPL-MCNC: 70 MG/DL
HGB BLD-MCNC: 14.3 G/DL (ref 11.1–15.9)
IMM GRANULOCYTES # BLD AUTO: 0 X10E3/UL (ref 0–0.1)
IMM GRANULOCYTES NFR BLD AUTO: 0 %
LDLC SERPL CALC-MCNC: 130 MG/DL (ref 0–99)
LYMPHOCYTES # BLD AUTO: 1.7 X10E3/UL (ref 0.7–3.1)
LYMPHOCYTES NFR BLD AUTO: 38 %
MCH RBC QN AUTO: 30.2 PG (ref 26.6–33)
MCHC RBC AUTO-ENTMCNC: 31.9 G/DL (ref 31.5–35.7)
MCV RBC AUTO: 95 FL (ref 79–97)
MONOCYTES # BLD AUTO: 0.4 X10E3/UL (ref 0.1–0.9)
MONOCYTES NFR BLD AUTO: 8 %
NEUTROPHILS # BLD AUTO: 2.2 X10E3/UL (ref 1.4–7)
NEUTROPHILS NFR BLD AUTO: 50 %
PLATELET # BLD AUTO: 270 X10E3/UL (ref 150–450)
POTASSIUM SERPL-SCNC: 5 MMOL/L (ref 3.5–5.2)
PROT SERPL-MCNC: 7 G/DL (ref 6–8.5)
RBC # BLD AUTO: 4.73 X10E6/UL (ref 3.77–5.28)
SODIUM SERPL-SCNC: 141 MMOL/L (ref 134–144)
TRIGL SERPL-MCNC: 69 MG/DL (ref 0–149)
TSH SERPL DL<=0.005 MIU/L-ACNC: 1.7 UIU/ML (ref 0.45–4.5)
VLDLC SERPL CALC-MCNC: 12 MG/DL (ref 5–40)
WBC # BLD AUTO: 4.3 X10E3/UL (ref 3.4–10.8)

## 2025-08-12 RX ORDER — SUMATRIPTAN 50 MG/1
TABLET, FILM COATED ORAL
Qty: 27 TABLET | Refills: 1 | Status: SHIPPED | OUTPATIENT
Start: 2025-08-12

## 2025-08-18 ENCOUNTER — TELEPHONE (OUTPATIENT)
Dept: INTERNAL MEDICINE | Facility: CLINIC | Age: 60
End: 2025-08-18
Payer: COMMERCIAL